# Patient Record
Sex: FEMALE | URBAN - METROPOLITAN AREA
[De-identification: names, ages, dates, MRNs, and addresses within clinical notes are randomized per-mention and may not be internally consistent; named-entity substitution may affect disease eponyms.]

---

## 2022-02-15 ENCOUNTER — OFFICE VISIT (OUTPATIENT)
Dept: FAMILY MEDICINE CLINIC | Facility: CLINIC | Age: 37
End: 2022-02-15
Payer: COMMERCIAL

## 2022-02-15 ENCOUNTER — LAB ENCOUNTER (OUTPATIENT)
Dept: LAB | Age: 37
End: 2022-02-15
Attending: FAMILY MEDICINE
Payer: COMMERCIAL

## 2022-02-15 VITALS
HEIGHT: 62 IN | DIASTOLIC BLOOD PRESSURE: 61 MMHG | SYSTOLIC BLOOD PRESSURE: 105 MMHG | BODY MASS INDEX: 23.19 KG/M2 | WEIGHT: 126 LBS | OXYGEN SATURATION: 98 % | RESPIRATION RATE: 19 BRPM | HEART RATE: 72 BPM

## 2022-02-15 DIAGNOSIS — Z01.419 WELL WOMAN EXAM WITH ROUTINE GYNECOLOGICAL EXAM: Primary | ICD-10-CM

## 2022-02-15 PROCEDURE — 3008F BODY MASS INDEX DOCD: CPT | Performed by: FAMILY MEDICINE

## 2022-02-15 PROCEDURE — 99385 PREV VISIT NEW AGE 18-39: CPT | Performed by: FAMILY MEDICINE

## 2022-02-15 PROCEDURE — 3074F SYST BP LT 130 MM HG: CPT | Performed by: FAMILY MEDICINE

## 2022-02-15 PROCEDURE — 3078F DIAST BP <80 MM HG: CPT | Performed by: FAMILY MEDICINE

## 2022-02-16 LAB
ABSOLUTE BASOPHILS: 51 CELLS/UL (ref 0–200)
ABSOLUTE LYMPHOCYTES: 1709 CELLS/UL (ref 850–3900)
ABSOLUTE MONOCYTES: 337 CELLS/UL (ref 200–950)
ABSOLUTE NEUTROPHILS: 2943 CELLS/UL (ref 1500–7800)
BASOPHILS: 1 %
BUN: 16 MG/DL (ref 7–25)
CALCIUM: 9.1 MG/DL (ref 8.6–10.2)
CHLORIDE: 102 MMOL/L (ref 98–110)
CHOL/HDLC RATIO: 2.3 (CALC)
CHOLESTEROL, TOTAL: 177 MG/DL
CREATININE: 0.89 MG/DL (ref 0.5–1.1)
EGFR IF AFRICN AM: 97 ML/MIN/1.73M2
EGFR IF NONAFRICN AM: 83 ML/MIN/1.73M2
EOSINOPHILS: 1.2 %
GLUCOSE: 93 MG/DL (ref 65–99)
HDL CHOLESTEROL: 77 MG/DL
HEMATOCRIT: 37.5 % (ref 35–45)
HEMOGLOBIN: 12.9 G/DL (ref 11.7–15.5)
LDL-CHOLESTEROL: 77 MG/DL (CALC)
LYMPHOCYTES: 33.5 %
MCH: 30.7 PG (ref 27–33)
MCHC: 34.4 G/DL (ref 32–36)
MCV: 89.3 FL (ref 80–100)
MONOCYTES: 6.6 %
MPV: 13 FL (ref 7.5–12.5)
NEUTROPHILS: 57.7 %
NON-HDL CHOLESTEROL: 100 MG/DL (CALC)
PLATELET COUNT: 225 THOUSAND/UL (ref 140–400)
POTASSIUM: 4.3 MMOL/L (ref 3.5–5.3)
RDW: 12.8 % (ref 11–15)
RED BLOOD CELL COUNT: 4.2 MILLION/UL (ref 3.8–5.1)
SODIUM: 137 MMOL/L (ref 135–146)
TRIGLYCERIDES: 129 MG/DL
TSH W/REFLEX TO FT4: 1.28 MIU/L
WHITE BLOOD CELL COUNT: 5.1 THOUSAND/UL (ref 3.8–10.8)

## 2022-02-17 LAB — HPV MRNA E6/E7: NOT DETECTED

## 2022-03-09 ENCOUNTER — MED REC SCAN ONLY (OUTPATIENT)
Dept: FAMILY MEDICINE CLINIC | Facility: CLINIC | Age: 37
End: 2022-03-09

## 2022-10-17 ENCOUNTER — NURSE ONLY (OUTPATIENT)
Dept: FAMILY MEDICINE CLINIC | Facility: CLINIC | Age: 37
End: 2022-10-17
Payer: COMMERCIAL

## 2022-10-17 DIAGNOSIS — Z23 NEED FOR VACCINATION: Primary | ICD-10-CM

## 2022-10-17 PROCEDURE — 90471 IMMUNIZATION ADMIN: CPT | Performed by: FAMILY MEDICINE

## 2022-10-17 PROCEDURE — 90686 IIV4 VACC NO PRSV 0.5 ML IM: CPT | Performed by: FAMILY MEDICINE

## 2022-10-17 PROCEDURE — 90715 TDAP VACCINE 7 YRS/> IM: CPT | Performed by: FAMILY MEDICINE

## 2022-10-17 PROCEDURE — 90472 IMMUNIZATION ADMIN EACH ADD: CPT | Performed by: FAMILY MEDICINE

## 2022-10-17 PROCEDURE — 90651 9VHPV VACCINE 2/3 DOSE IM: CPT | Performed by: FAMILY MEDICINE

## 2022-11-14 ENCOUNTER — NURSE ONLY (OUTPATIENT)
Dept: FAMILY MEDICINE CLINIC | Facility: CLINIC | Age: 37
End: 2022-11-14
Payer: COMMERCIAL

## 2022-11-14 DIAGNOSIS — Z23 NEED FOR VACCINATION: Primary | ICD-10-CM

## 2022-11-14 PROCEDURE — 90471 IMMUNIZATION ADMIN: CPT | Performed by: FAMILY MEDICINE

## 2022-11-14 PROCEDURE — 90651 9VHPV VACCINE 2/3 DOSE IM: CPT | Performed by: FAMILY MEDICINE

## 2023-02-21 ENCOUNTER — OFFICE VISIT (OUTPATIENT)
Dept: FAMILY MEDICINE CLINIC | Facility: CLINIC | Age: 38
End: 2023-02-21

## 2023-02-21 ENCOUNTER — LAB ENCOUNTER (OUTPATIENT)
Dept: LAB | Age: 38
End: 2023-02-21
Payer: COMMERCIAL

## 2023-02-21 VITALS
WEIGHT: 123 LBS | DIASTOLIC BLOOD PRESSURE: 64 MMHG | HEIGHT: 62 IN | TEMPERATURE: 98 F | HEART RATE: 71 BPM | SYSTOLIC BLOOD PRESSURE: 102 MMHG | BODY MASS INDEX: 22.63 KG/M2

## 2023-02-21 DIAGNOSIS — R79.9 ABNORMAL BLOOD CHEMISTRY: ICD-10-CM

## 2023-02-21 DIAGNOSIS — Z00.00 WELL WOMAN EXAM WITHOUT GYNECOLOGICAL EXAM: Primary | ICD-10-CM

## 2023-02-21 PROCEDURE — 3078F DIAST BP <80 MM HG: CPT

## 2023-02-21 PROCEDURE — 3008F BODY MASS INDEX DOCD: CPT

## 2023-02-21 PROCEDURE — 99395 PREV VISIT EST AGE 18-39: CPT

## 2023-02-21 PROCEDURE — 3074F SYST BP LT 130 MM HG: CPT

## 2023-02-21 NOTE — PATIENT INSTRUCTIONS
Can try taking ibuprofen 600mg twice daily 3 days before your cycle and during the first 2 days of your cycle to assist with cramping. Make sure to take with food.

## 2023-02-22 LAB
ABSOLUTE BASOPHILS: 68 CELLS/UL (ref 0–200)
ABSOLUTE EOSINOPHILS: 120 CELLS/UL (ref 15–500)
ABSOLUTE LYMPHOCYTES: 3248 CELLS/UL (ref 850–3900)
ABSOLUTE MONOCYTES: 653 CELLS/UL (ref 200–950)
ABSOLUTE NEUTROPHILS: 3413 CELLS/UL (ref 1500–7800)
ALBUMIN/GLOBULIN RATIO: 1.8 (CALC) (ref 1–2.5)
ALBUMIN: 4.7 G/DL (ref 3.6–5.1)
ALKALINE PHOSPHATASE: 68 U/L (ref 31–125)
ALT: 12 U/L (ref 6–29)
AST: 19 U/L (ref 10–30)
BASOPHILS: 0.9 %
BILIRUBIN, TOTAL: 0.5 MG/DL (ref 0.2–1.2)
BUN: 18 MG/DL (ref 7–25)
CALCIUM: 9.7 MG/DL (ref 8.6–10.2)
CARBON DIOXIDE: 27 MMOL/L (ref 20–32)
CHLORIDE: 103 MMOL/L (ref 98–110)
CHOL/HDLC RATIO: 2 (CALC)
CHOLESTEROL, TOTAL: 180 MG/DL
CREATININE: 0.86 MG/DL (ref 0.5–0.97)
EGFR: 89 ML/MIN/1.73M2
EOSINOPHILS: 1.6 %
GLOBULIN: 2.6 G/DL (CALC) (ref 1.9–3.7)
GLUCOSE: 110 MG/DL (ref 65–99)
HDL CHOLESTEROL: 90 MG/DL
HEMATOCRIT: 40.6 % (ref 35–45)
HEMOGLOBIN: 13.7 G/DL (ref 11.7–15.5)
LDL-CHOLESTEROL: 68 MG/DL (CALC)
LYMPHOCYTES: 43.3 %
MCH: 30.4 PG (ref 27–33)
MCHC: 33.7 G/DL (ref 32–36)
MCV: 90 FL (ref 80–100)
MONOCYTES: 8.7 %
MPV: 13.1 FL (ref 7.5–12.5)
NEUTROPHILS: 45.5 %
NON-HDL CHOLESTEROL: 90 MG/DL (CALC)
PLATELET COUNT: 230 THOUSAND/UL (ref 140–400)
POTASSIUM: 3.6 MMOL/L (ref 3.5–5.3)
PROTEIN, TOTAL: 7.3 G/DL (ref 6.1–8.1)
RDW: 12.9 % (ref 11–15)
RED BLOOD CELL COUNT: 4.51 MILLION/UL (ref 3.8–5.1)
SODIUM: 136 MMOL/L (ref 135–146)
T4, FREE: 1.2 NG/DL (ref 0.8–1.8)
TRIGLYCERIDES: 136 MG/DL
TSH W/REFLEX TO FT4: 4.53 MIU/L
WHITE BLOOD CELL COUNT: 7.5 THOUSAND/UL (ref 3.8–10.8)

## 2024-01-09 ENCOUNTER — LAB ENCOUNTER (OUTPATIENT)
Dept: LAB | Age: 39
End: 2024-01-09
Payer: COMMERCIAL

## 2024-01-09 ENCOUNTER — OFFICE VISIT (OUTPATIENT)
Dept: FAMILY MEDICINE CLINIC | Facility: CLINIC | Age: 39
End: 2024-01-09
Payer: COMMERCIAL

## 2024-01-09 VITALS
HEIGHT: 62.5 IN | WEIGHT: 116 LBS | DIASTOLIC BLOOD PRESSURE: 62 MMHG | SYSTOLIC BLOOD PRESSURE: 106 MMHG | TEMPERATURE: 98 F | HEART RATE: 90 BPM | BODY MASS INDEX: 20.81 KG/M2 | OXYGEN SATURATION: 96 %

## 2024-01-09 DIAGNOSIS — Z00.00 WELL WOMAN EXAM WITHOUT GYNECOLOGICAL EXAM: Primary | ICD-10-CM

## 2024-01-09 DIAGNOSIS — S63.501D SPRAIN OF RIGHT WRIST, SUBSEQUENT ENCOUNTER: ICD-10-CM

## 2024-01-09 DIAGNOSIS — Z23 NEED FOR VACCINATION: ICD-10-CM

## 2024-01-09 PROCEDURE — 90472 IMMUNIZATION ADMIN EACH ADD: CPT

## 2024-01-09 PROCEDURE — 3008F BODY MASS INDEX DOCD: CPT

## 2024-01-09 PROCEDURE — 99395 PREV VISIT EST AGE 18-39: CPT

## 2024-01-09 PROCEDURE — 90686 IIV4 VACC NO PRSV 0.5 ML IM: CPT

## 2024-01-09 PROCEDURE — 3078F DIAST BP <80 MM HG: CPT

## 2024-01-09 PROCEDURE — 90471 IMMUNIZATION ADMIN: CPT

## 2024-01-09 PROCEDURE — 3074F SYST BP LT 130 MM HG: CPT

## 2024-01-09 PROCEDURE — 90651 9VHPV VACCINE 2/3 DOSE IM: CPT

## 2024-01-09 NOTE — PROGRESS NOTES
Tamanna Singleton is a 38 year old female.  Chief Complaint   Patient presents with    Physical     Here for annual physical     HPI:   Tamanna Singleton presented to the clinic for annual physical examination. No acute concerns. No changes in family/personal history. Normal Sleep. Normal appetite. Balanced diet. Normal BM/Urination. Physically active.  Sexually active. One partner. LMP 12/18/23, regular cycles. No daily medications.     Following with [facility] - Dr. Ac. Follow up 1/17/24. Sprain of the right wrist. Right handed.     Current Outpatient Medications   Medication Sig Dispense Refill    hydrocortisone 2.5 % External Ointment Apply 1 Application topically 2 (two) times daily.        Past Medical History:   Diagnosis Date    Miscarriage       Past Surgical History:   Procedure Laterality Date    LASIK Bilateral 10/20/2022      Social History:  Social History     Socioeconomic History    Marital status:    Tobacco Use    Smoking status: Never    Smokeless tobacco: Never   Vaping Use    Vaping Use: Never used   Substance and Sexual Activity    Alcohol use: Yes     Comment: occa    Drug use: Never      Family History   Problem Relation Age of Onset    Heart Disorder Father     Diabetes Mother       Allergies   Allergen Reactions    Dust OTHER (SEE COMMENTS)     Sneezing          REVIEW OF SYSTEMS:   Review of Systems   Constitutional: Negative.  Negative for activity change.   HENT: Negative.     Eyes: Negative.    Respiratory: Negative.  Negative for chest tightness and shortness of breath.    Cardiovascular: Negative.  Negative for chest pain.   Gastrointestinal:  Negative for abdominal distention, abdominal pain, constipation, diarrhea and nausea.   Genitourinary: Negative.  Negative for difficulty urinating, menstrual problem and vaginal discharge.   Musculoskeletal: Negative.    Skin: Negative.    Neurological: Negative.    Psychiatric/Behavioral: Negative.        Wt Readings from  Last 5 Encounters:   01/09/24 116 lb (52.6 kg)   02/21/23 123 lb (55.8 kg)   02/15/22 126 lb (57.2 kg)     Body mass index is 20.88 kg/m².      EXAM:   /62 (BP Location: Left arm, Patient Position: Sitting, Cuff Size: adult)   Pulse 90   Temp 97.5 °F (36.4 °C) (Temporal)   Ht 5' 2.5\" (1.588 m)   Wt 116 lb (52.6 kg)   LMP 12/18/2023 (Exact Date)   SpO2 96%   BMI 20.88 kg/m²   Physical Exam  Vitals reviewed.   Constitutional:       Appearance: Normal appearance.   HENT:      Head: Normocephalic and atraumatic.      Right Ear: Tympanic membrane normal.      Left Ear: Tympanic membrane normal.      Mouth/Throat:      Mouth: Mucous membranes are moist.      Pharynx: Oropharynx is clear.   Eyes:      Pupils: Pupils are equal, round, and reactive to light.   Cardiovascular:      Rate and Rhythm: Normal rate and regular rhythm.      Pulses: Normal pulses.      Heart sounds: Normal heart sounds.   Pulmonary:      Effort: Pulmonary effort is normal.      Breath sounds: Normal breath sounds.   Chest:   Breasts:     Right: Normal.      Left: Normal.   Abdominal:      General: Abdomen is flat. There is no distension.      Palpations: Abdomen is soft. There is no mass.      Tenderness: There is no abdominal tenderness. There is no guarding or rebound.      Hernia: No hernia is present.   Musculoskeletal:         General: Normal range of motion.      Cervical back: Normal range of motion.   Lymphadenopathy:      Upper Body:      Right upper body: No axillary adenopathy.      Left upper body: No axillary adenopathy.   Skin:     General: Skin is warm.   Neurological:      General: No focal deficit present.      Mental Status: She is alert and oriented to person, place, and time.   Psychiatric:         Mood and Affect: Mood normal.         Behavior: Behavior normal.            ASSESSMENT AND PLAN:   (Z00.00) Well woman exam without gynecological exam  (primary encounter diagnosis)  Plan: COMP METABOLIC PANEL [49722] [Q],  TSH W REFLEX         TO FREE T4 [00976][Q], LIPID PANEL [7600] [Q],         CBC With Differential With Platelet   - Immunizations UTD   - tobacco, alcohol, illicit drug use discouraged  - safe sexual practices advised  - Reinforced healthy diet, lifestyle, and exercise.  - Past Medical/Social/Family histories reviewed  - Regular dental visits recommended   - Regular eye exams recommended     (S63.266D) Sprain of right wrist, subsequent encounter  Plan: patient with sprain to the right wrist. Following with [facility] - Dr. Ac. Wearing brace. Further management pending specialist.     (Z23) Need for vaccination  Plan: Fluzone Quadrivalent 6mo+ 0.5mL, GARDASIL 9,         Immunization Admin Counseling, Additional         Component, 18 years and older, Immunization         Admin Counseling, 1st Component, 18 years and         older  Flu and HPV given today. Informed consent signed.       Follow up in 1 year or sooner if needed      The patient indicates understanding of these issues and agrees to the plan.    This note was prepared using [employer] voice recognition dictation software. As a result errors may occur. When identified these errors have been corrected. While every attempt is made to correct errors during dictation discrepancies may still exist.

## 2024-01-10 LAB
ABSOLUTE BASOPHILS: 58 CELLS/UL (ref 0–200)
ABSOLUTE EOSINOPHILS: 58 CELLS/UL (ref 15–500)
ABSOLUTE LYMPHOCYTES: 2117 CELLS/UL (ref 850–3900)
ABSOLUTE MONOCYTES: 464 CELLS/UL (ref 200–950)
ABSOLUTE NEUTROPHILS: 3103 CELLS/UL (ref 1500–7800)
ALBUMIN/GLOBULIN RATIO: 1.6 (CALC) (ref 1–2.5)
ALBUMIN: 4.7 G/DL (ref 3.6–5.1)
ALKALINE PHOSPHATASE: 55 U/L (ref 31–125)
ALT: 17 U/L (ref 6–29)
AST: 17 U/L (ref 10–30)
BASOPHILS: 1 %
BILIRUBIN, TOTAL: 1.1 MG/DL (ref 0.2–1.2)
BUN: 13 MG/DL (ref 7–25)
CALCIUM: 9.6 MG/DL (ref 8.6–10.2)
CARBON DIOXIDE: 26 MMOL/L (ref 20–32)
CHLORIDE: 102 MMOL/L (ref 98–110)
CHOL/HDLC RATIO: 2 (CALC)
CHOLESTEROL, TOTAL: 186 MG/DL
CREATININE: 0.96 MG/DL (ref 0.5–0.97)
EGFR: 78 ML/MIN/1.73M2
EOSINOPHILS: 1 %
GLOBULIN: 3 G/DL (CALC) (ref 1.9–3.7)
GLUCOSE: 87 MG/DL (ref 65–99)
HDL CHOLESTEROL: 95 MG/DL
HEMATOCRIT: 41 % (ref 35–45)
HEMOGLOBIN: 14.2 G/DL (ref 11.7–15.5)
LDL-CHOLESTEROL: 72 MG/DL (CALC)
LYMPHOCYTES: 36.5 %
MCH: 31.6 PG (ref 27–33)
MCHC: 34.6 G/DL (ref 32–36)
MCV: 91.1 FL (ref 80–100)
MONOCYTES: 8 %
MPV: 13.2 FL (ref 7.5–12.5)
NEUTROPHILS: 53.5 %
NON-HDL CHOLESTEROL: 91 MG/DL (CALC)
PLATELET COUNT: 226 THOUSAND/UL (ref 140–400)
POTASSIUM: 4.2 MMOL/L (ref 3.5–5.3)
PROTEIN, TOTAL: 7.7 G/DL (ref 6.1–8.1)
RDW: 12.2 % (ref 11–15)
RED BLOOD CELL COUNT: 4.5 MILLION/UL (ref 3.8–5.1)
SODIUM: 137 MMOL/L (ref 135–146)
TRIGLYCERIDES: 102 MG/DL
TSH W/REFLEX TO FT4: 1.91 MIU/L
WHITE BLOOD CELL COUNT: 5.8 THOUSAND/UL (ref 3.8–10.8)

## 2025-02-26 ENCOUNTER — OFFICE VISIT (OUTPATIENT)
Dept: FAMILY MEDICINE CLINIC | Facility: CLINIC | Age: 40
End: 2025-02-26
Payer: COMMERCIAL

## 2025-02-26 VITALS
OXYGEN SATURATION: 97 % | WEIGHT: 125 LBS | BODY MASS INDEX: 23 KG/M2 | DIASTOLIC BLOOD PRESSURE: 74 MMHG | SYSTOLIC BLOOD PRESSURE: 113 MMHG | TEMPERATURE: 98 F | RESPIRATION RATE: 18 BRPM | HEART RATE: 76 BPM

## 2025-02-26 DIAGNOSIS — L23.9 ALLERGIC DERMATITIS: ICD-10-CM

## 2025-02-26 DIAGNOSIS — R51.9 FACIAL PAIN: Primary | ICD-10-CM

## 2025-02-26 DIAGNOSIS — R06.7 SNEEZING: ICD-10-CM

## 2025-02-26 PROCEDURE — 99214 OFFICE O/P EST MOD 30 MIN: CPT | Performed by: FAMILY MEDICINE

## 2025-02-26 RX ORDER — CRISABOROLE 20 MG/G
60 OINTMENT TOPICAL DAILY
COMMUNITY
Start: 2024-04-01

## 2025-02-26 NOTE — PROGRESS NOTES
HPI:    Tamanna Singleton is a 39 year old female presents clinic for follow-up.  Patient states that at least 2-3 times a week she experiences sneezing in the morning followed by nasal/facial pain.  Saw an ENT in 2020, had a normal nasal endoscopy.  She has tried over-the-counter allergy medications without any improvement.  Additionally, she is currently under the care of dermatology for treatment of allergic dermatitis. Has questions about management.     HISTORY:  Past Medical History:    Miscarriage (HCC)      Past Surgical History:   Procedure Laterality Date    Lasik Bilateral 10/20/2022      Family History   Problem Relation Age of Onset    Heart Disorder Father     Diabetes Mother       Social History:   Social History     Socioeconomic History    Marital status:    Tobacco Use    Smoking status: Never     Passive exposure: Never    Smokeless tobacco: Never   Vaping Use    Vaping status: Never Used   Substance and Sexual Activity    Alcohol use: Yes     Comment: occa    Drug use: Never     Social Drivers of Health      [facility]    Housing Stability        Medications (Active prior to today's visit):  Current Outpatient Medications   Medication Sig Dispense Refill    EUCRISA 2 % External Ointment Apply 60 g topically daily.         Allergies:  Allergies[1]    ROS:   Review of Systems   All other systems reviewed and are negative.      PHYSICAL EXAM:     Vitals:    02/26/25 1114   BP: 113/74   BP Location: Right arm   Patient Position: Sitting   Cuff Size: adult   Pulse: 76   Resp: 18   Temp: 97.6 °F (36.4 °C)   TempSrc: Temporal   SpO2: 97%   Weight: 125 lb (56.7 kg)     Physical Exam  Vitals reviewed.   Constitutional:       General: She is not in acute distress.  Cardiovascular:      Rate and Rhythm: Normal rate.   Pulmonary:      Effort: Pulmonary effort is normal. No respiratory distress.   Neurological:      Mental Status: She  is alert.   Psychiatric:         Mood and Affect: Mood normal.         ASSESSMENT/PLAN:   (R51.9) Facial pain  (primary encounter diagnosis)  (R06.7) Sneezing  Plan: ENT Referral - In Network  - ENT referral placed. Follow up as needed.     (L23.9) Allergic dermatitis  Plan:   -Dermatology notes reviewed with patient.  All questions answered to the best my ability.  Follow-up as needed.           Responsible party/patient verbalized understanding of information discussed. No barriers to learning observed.            Orders This Visit:  No orders of the defined types were placed in this encounter.      Meds This Visit:  Requested Prescriptions      No prescriptions requested or ordered in this encounter       Imaging & Referrals:  ENT - INTERNAL     Chaperone offered at visit today.     The 21st Century cures Act makes medical notes like these available to patients in the interest of transparency.  However, be advised that this is a medical document.  It is intended as peer to peer communication.  It is written in medical language and may contain abbreviations or verbiage that are unfamiliar.  It may appear blunt or direct.  Medical documents are intended to carry relevant information, facts as evident, and the clinical opinion of the practitioner.      This note was created by [employer] voice recognition. Errors in content may be related to improper recognition by the system; efforts to review and correct have been done but errors may still exist. Please contact me with any questions.       2/26/2025  Salvador Soliz MD       [1]   Allergies  Allergen Reactions    Benzalkonium Chloride ITCHING, PAIN and RASH    Limonene ITCHING, PAIN and RASH    Dust OTHER (SEE COMMENTS)     Sneezing

## 2025-04-28 ENCOUNTER — LAB ENCOUNTER (OUTPATIENT)
Dept: LAB | Age: 40
End: 2025-04-28
Payer: COMMERCIAL

## 2025-04-28 ENCOUNTER — OFFICE VISIT (OUTPATIENT)
Dept: FAMILY MEDICINE CLINIC | Facility: CLINIC | Age: 40
End: 2025-04-28
Payer: COMMERCIAL

## 2025-04-28 VITALS
SYSTOLIC BLOOD PRESSURE: 100 MMHG | TEMPERATURE: 98 F | HEART RATE: 73 BPM | OXYGEN SATURATION: 98 % | DIASTOLIC BLOOD PRESSURE: 62 MMHG | WEIGHT: 118 LBS | HEIGHT: 63.5 IN | BODY MASS INDEX: 20.65 KG/M2

## 2025-04-28 DIAGNOSIS — Z00.00 ENCOUNTER FOR ANNUAL PHYSICAL EXAM: ICD-10-CM

## 2025-04-28 DIAGNOSIS — Z12.31 SCREENING MAMMOGRAM FOR BREAST CANCER: ICD-10-CM

## 2025-04-28 DIAGNOSIS — Z00.00 ENCOUNTER FOR ANNUAL PHYSICAL EXAM: Primary | ICD-10-CM

## 2025-04-28 DIAGNOSIS — J34.89 RHINORRHEA: ICD-10-CM

## 2025-04-28 DIAGNOSIS — Z91.09 ENVIRONMENTAL ALLERGIES: ICD-10-CM

## 2025-04-28 LAB
ANION GAP SERPL CALC-SCNC: 7 MMOL/L (ref 0–18)
BUN BLD-MCNC: 13 MG/DL (ref 9–23)
BUN/CREAT SERPL: 14.4 (ref 10–20)
CALCIUM BLD-MCNC: 8.9 MG/DL (ref 8.7–10.4)
CHLORIDE SERPL-SCNC: 107 MMOL/L (ref 98–112)
CHOLEST SERPL-MCNC: 156 MG/DL (ref ?–200)
CO2 SERPL-SCNC: 26 MMOL/L (ref 21–32)
CREAT BLD-MCNC: 0.9 MG/DL (ref 0.55–1.02)
EGFRCR SERPLBLD CKD-EPI 2021: 83 ML/MIN/1.73M2 (ref 60–?)
FASTING PATIENT LIPID ANSWER: NO
FASTING STATUS PATIENT QL REPORTED: NO
GLUCOSE BLD-MCNC: 82 MG/DL (ref 70–99)
HDLC SERPL-MCNC: 73 MG/DL (ref 40–59)
LDLC SERPL CALC-MCNC: 65 MG/DL (ref ?–100)
NONHDLC SERPL-MCNC: 83 MG/DL (ref ?–130)
OSMOLALITY SERPL CALC.SUM OF ELEC: 289 MOSM/KG (ref 275–295)
POTASSIUM SERPL-SCNC: 4.3 MMOL/L (ref 3.5–5.1)
SODIUM SERPL-SCNC: 140 MMOL/L (ref 136–145)
TRIGL SERPL-MCNC: 101 MG/DL (ref 30–149)
TSI SER-ACNC: 1.05 UIU/ML (ref 0.55–4.78)
VLDLC SERPL CALC-MCNC: 15 MG/DL (ref 0–30)

## 2025-04-28 PROCEDURE — 36415 COLL VENOUS BLD VENIPUNCTURE: CPT

## 2025-04-28 PROCEDURE — 84443 ASSAY THYROID STIM HORMONE: CPT

## 2025-04-28 PROCEDURE — 80048 BASIC METABOLIC PNL TOTAL CA: CPT

## 2025-04-28 PROCEDURE — 80061 LIPID PANEL: CPT

## 2025-04-28 PROCEDURE — 99396 PREV VISIT EST AGE 40-64: CPT

## 2025-04-28 RX ORDER — CHLORHEXIDINE GLUCONATE 4 %
LIQUID (ML) TOPICAL
COMMUNITY

## 2025-04-28 NOTE — PROGRESS NOTES
Tamanna Singleton is a 40 year old female.  Chief Complaint   Patient presents with    Physical     Here for annual physical.    Allergies     Here to discuss allergies( indoor)     HPI:   Tamanna Singleton presented to the clinic for annual physical examination. No acute concerns. No changes in family/personal history. Normal Sleep. Normal appetite. Balanced diet. Normal BM/Urination. Physically active.  LMP 4/23/2025. patient with environmental allergies.  Follow-up with dermatologist and ENT.  Limited pharmacological management due to allergies.  Has been recommended to see allergist.  Needs referral.        Current Medications[1]   Past Medical History[2]   Past Surgical History[3]   Social History:  Short Social Hx on File[4]   Family History[5]   Allergies[6]     REVIEW OF SYSTEMS:   Review of Systems   Constitutional: Negative.  Negative for activity change.   HENT: Negative.     Eyes: Negative.    Respiratory: Negative.  Negative for chest tightness and shortness of breath.    Cardiovascular: Negative.  Negative for chest pain.   Gastrointestinal:  Negative for abdominal distention, abdominal pain, constipation, diarrhea and nausea.   Genitourinary: Negative.  Negative for difficulty urinating, menstrual problem and vaginal discharge.   Musculoskeletal: Negative.    Skin: Negative.    Neurological: Negative.    Psychiatric/Behavioral: Negative.        Wt Readings from Last 5 Encounters:   04/28/25 118 lb (53.5 kg)   02/26/25 125 lb (56.7 kg)   01/09/24 116 lb (52.6 kg)   02/21/23 123 lb (55.8 kg)   02/15/22 126 lb (57.2 kg)     Body mass index is 20.57 kg/m².      EXAM:   /62 (BP Location: Right arm, Patient Position: Sitting, Cuff Size: adult)   Pulse 73   Temp 98.1 °F (36.7 °C) (Temporal)   Ht 5' 3.5\" (1.613 m)   Wt 118 lb (53.5 kg)   LMP 04/23/2025 (Exact Date)   SpO2 98%   BMI 20.57 kg/m²   Physical Exam  Vitals reviewed.   Constitutional:       Appearance: Normal appearance.   HENT:      Head:  Normocephalic and atraumatic.      Right Ear: Tympanic membrane normal.      Left Ear: Tympanic membrane normal.      Mouth/Throat:      Mouth: Mucous membranes are moist.      Pharynx: Oropharynx is clear.   Eyes:      Pupils: Pupils are equal, round, and reactive to light.   Cardiovascular:      Rate and Rhythm: Normal rate and regular rhythm.      Pulses: Normal pulses.      Heart sounds: Normal heart sounds.   Pulmonary:      Effort: Pulmonary effort is normal.      Breath sounds: Normal breath sounds.   Chest:   Breasts:     Right: Normal.      Left: Normal.   Abdominal:      General: Abdomen is flat. There is no distension.      Palpations: Abdomen is soft. There is no mass.      Tenderness: There is no abdominal tenderness. There is no guarding or rebound.      Hernia: No hernia is present.   Musculoskeletal:         General: Normal range of motion.      Cervical back: Normal range of motion.   Skin:     General: Skin is warm.   Neurological:      General: No focal deficit present.      Mental Status: She is alert and oriented to person, place, and time.   Psychiatric:         Mood and Affect: Mood normal.         Behavior: Behavior normal.            ASSESSMENT AND PLAN:   (Z00.00) Encounter for annual physical exam  (primary encounter diagnosis)  Plan: Lipid Panel [E], Basic Metabolic Panel (8) [E],        TSH W Reflex To Free T4 [E]        - Immunizations UTD   - tobacco, alcohol, illicit drug use discouraged  - safe sexual practices advised  - Reinforced healthy diet, lifestyle, and exercise.  - Past Medical/Social/Family histories reviewed  - Reinforced healthy foods, dental hygiene, limited screen time, and regular physical activity.   - advised use of seat belts, helmets, and other protective gear as indicated for activities   - Regular dental visits recommended   - Regular eye exams recommended     Health Maintenance   Topic Date Due    Pap Smear  02/15/2027     Health Maintenance   Topic Date Due     Mammogram  Never done      No recommendations at this time   No recommendations at this time      Follow up in 1 year or sooner if needed      (Z12.31) Screening mammogram for breast cancer  Plan: [facility] 2D+3D SCREENING BILAT         (CPT=77067/36808)        Order for mammogram placed.  Advised to schedule.    (Z91.09) Environmental allergies  (J34.89) Rhinorrhea  Plan: Allergy Referral - [facility] ([facility])        Patient with environmental allergies.  Has been recommended to see allergist.  Needs referral.  Referral placed.      Follow up in 1 year or sooner if needed      The patient indicates understanding of these issues and agrees to the plan.  Chaperone offered to the patient prior to examination    This note was prepared using [employer] voice recognition dictation software. As a result errors may occur. When identified these errors have been corrected. While every attempt is made to correct errors during dictation discrepancies may still exist.       [1]   Current Outpatient Medications   Medication Sig Dispense Refill    Melatonin 12 MG Oral Tab Take by mouth.      EUCRISA 2 % External Ointment Apply 60 g topically daily.     [2]   Past Medical History:   Miscarriage (HCC)   [3]   Past Surgical History:  Procedure Laterality Date    Lasik Bilateral 10/20/2022   [4]   Social History  Socioeconomic History    Marital status:    Tobacco Use    Smoking status: Never     Passive exposure: Never    Smokeless tobacco: Never   Vaping Use    Vaping status: Never Used   Substance and Sexual Activity    Alcohol use: Yes     Comment: occa    Drug use: Never     Social Drivers of Health     Food Insecurity: No Food Insecurity (4/28/2025)    NCSS - Food Insecurity     Worried About Running Out of Food in the Last Year: No     Ran Out of Food in the Last Year: No   Transportation Needs: No Transportation Needs (4/28/2025)    NCSS - Transportation     Lack of Transportation: No   Housing Stability: Not At  Risk (4/28/2025)    NCSS - Housing/Utilities     Has Housing: Yes     Worried About Losing Housing: No     Unable to Get Utilities: No   [5]   Family History  Problem Relation Age of Onset    Heart Disorder Father     Diabetes Mother    [6]   Allergies  Allergen Reactions    Benzalkonium Chloride ITCHING, PAIN and RASH    Limonene ITCHING, PAIN and RASH    Dust OTHER (SEE COMMENTS)     Sneezing